# Patient Record
Sex: MALE | Race: WHITE
[De-identification: names, ages, dates, MRNs, and addresses within clinical notes are randomized per-mention and may not be internally consistent; named-entity substitution may affect disease eponyms.]

---

## 2019-11-19 ENCOUNTER — HOSPITAL ENCOUNTER (EMERGENCY)
Dept: HOSPITAL 25 - UCEAST | Age: 69
Discharge: HOME | End: 2019-11-19
Payer: MEDICARE

## 2019-11-19 VITALS — SYSTOLIC BLOOD PRESSURE: 134 MMHG | DIASTOLIC BLOOD PRESSURE: 80 MMHG

## 2019-11-19 DIAGNOSIS — S60.862A: Primary | ICD-10-CM
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
## 2019-11-19 DIAGNOSIS — Z91.018: ICD-10-CM

## 2019-11-19 DIAGNOSIS — M25.532: ICD-10-CM
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
## 2019-11-19 PROCEDURE — 99211 OFF/OP EST MAY X REQ PHY/QHP: CPT

## 2019-11-19 PROCEDURE — G0463 HOSPITAL OUTPT CLINIC VISIT: HCPCS

## 2019-11-19 NOTE — UC
Hand/Wrist HPI





- HPI Summary


HPI Summary: 





70 yo male presents with tick bite and left wrist pain. He tells me that 1 week 

ago he had a tick bite to his left forearm that he removed within 24 hours - 

was very small. Since that time he has been noticing some pain in his dorsal 

left wrist that he described as an ache. He is very active with his hands doing 

manual labor activities and yard work - denies specific injury. Resting his 

wrist makes it feel better. He is concerned he has lyme disease effecting his 

wrist. Denies numbness, tingling, radiation of pain, injury, fevers, chills, 

body aches. 





- History Of Current Complaint


Stated Complaint: WRIST PAIN AND POSS TICK


Time Seen by Provider: 11/19/19 17:52


Hx Obtained From: Patient


Onset/Duration: Sudden Onset


Severity Initially: Mild


Severity Currently: Mild


Pain Intensity: 3


Pain Scale Used: 0-10 Numeric





- Allergies/Home Medications


Allergies/Adverse Reactions: 


 Allergies











Allergy/AdvReac Type Severity Reaction Status Date / Time


 


watermelon Allergy  throat Verified 11/19/19 18:03





   swelling  











Home Medications: 


 Home Medications





Aspirin/Caffeine [Saranya Back & Body Pain Ex] 1 tab PO PRN 11/19/19 [History]











PMH/Surg Hx/FS Hx/Imm Hx





- Additional Past Medical History


Additional PMH: 





Erectile dysfunction


Endocrine History: Dyslipidemia





- Surgical History


Surgical History: Yes


Other Surgical History: Tonsillectomy





- Family History


Known Family History: Positive: Unknown





- Social History


Occupation: Retired


Lives: With Family


Alcohol Use: Occasionally


Substance Use Type: None


Smoking Status (MU): Never Smoked Tobacco





Review of Systems


All Other Systems Reviewed And Are Negative: No


Constitutional: Positive: Negative


Skin: Positive: Other - Tick bite


Respiratory: Positive: Negative


Cardiovascular: Positive: Negative


Neurovascular: Positive: Negative


Musculoskeletal: Positive: Other: - Left wrist pain


Neurological: Positive: Negative


Psychological: Positive: Negative





Physical Exam





- Summary


Physical Exam Summary: 





GENERAL: NAD. WDWN. No pain distress.


SKIN: LEFT FOREARM: there is a 5mm diameter of mild erythema with central 1mm 

area of superficial skin loss - healing. No streaking, bleeding, or drainage.


CHEST:  No accessory muscle use. Breathing comfortably and in no distress.


CV:  Pulses intact radial and ulnar. Cap refill <2seconds


MSK: LEFT WRIST: NTTP. FROM. Strength 5/5 including  strength. No edema or 

obvious bony deformities. No snuffbox tenderness. 


NEURO: Alert. Sensations intact hand and all fingers.


PSYCH: Age appropriate behavior.


Triage Information Reviewed: Yes


Vital Signs: 





Vital Signs:











Temp Pulse Resp BP Pulse Ox


 


 98.4 F   58   16   134/80   99 


 


 11/19/19 17:58  11/19/19 17:58  11/19/19 17:58  11/19/19 17:58  11/19/19 17:58











Vital Signs Reviewed: Yes





Diagnostics





- Radiology


  ** Wrist XR


Radiology Interpretation Completed By: ED Physician


Summary of Radiographic Findings: NAD





Hand/Wrist Course/Dx





- Course


Course Of Treatment: 





Left forearm area consistent with tick bite. Left wrist discomfort very 

unlikely from tick bite as 1) pt removed within 24hours and 2) joint pain from 

lyme disease likely takes weeks to months.


Discussed this with pt and recommended lyme disease testing with his PCP in 4-6 

weeks.


XR of wrist today wet read negative - suspect tendinitis given his active 

lifestyle. Recommended RICE therapy and f/u with his PCP if symptoms do not 

improve





- Differential Dx/Diagnosis


Provider Diagnosis: 


 Tick bite, Left wrist pain








Discharge ED





- Sign-Out/Discharge


Documenting (check all that apply): Patient Departure


All imaging exams completed and their final reports reviewed: No





- Discharge Plan


Condition: Stable


Disposition: HOME


Patient Education Materials:  Tendinitis (ED)


Referrals: 


Pradeep Ferreira MD [Primary Care Provider] - 


Additional Instructions: 


If you develop a fever, shortness of breath, chest pain, new or worsening 

symptoms - please call your PCP or go to the ED immediately.


 


The x-ray of your wrist appear normal this evening.





I recommend that you rest your wrist and apply ice to the area. Your wrist pain 

is not due to the recent bite on your arm, but I recommend you schedule an 

appointment with your primary doctor in 1 month for lyme disease testing given 

your (potential) recent tick bite





- Billing Disposition and Condition


Condition: STABLE


Disposition: Home

## 2019-11-19 NOTE — XMS REPORT
Continuity of Care Document (CCD)

 Created on:2019



Patient:Killian Sethi

Sex:Male

:1950

External Reference #:[id number]





Demographics







 Address  [address] [place] [address]

 

 Home Phone  [phone]

 

 Mobile Phone  [phone]

 

 Work Phone  [phone]

 

 Preferred Language  en

 

 Marital Status  Not  or 

 

 Worship Affiliation  Unknown

 

 Race  White

 

 Ethnic Group  Not  or 









Author







 Name  Jennifer Mcfadden O.D.

 

 Address  [address] [place] [address]









Care Team Providers







 Name  Role  Phone

 

 Pradeep Ferreira M.D. - Family Medicine  Care Team Information   [phone]
[phone]









Problems







 Active Problems  Provider  Date

 

 Superficial punctate keratitis  Jennifer Mcfadden O.D.  Onset: 2018

 

 Epiretinal membrane  Jennifer Mcfadden O.D.  Onset: 2018

 

 Central corneal ulcer  Jennifer Mcfadden O.D.  Onset: 2018

 

 Nuclear senile cataract  Jennifer Mcfadden O.D.  Onset: 10/30/2019







Social History







 Type  Date  Description  Comments

 

 Birth Sex    Unknown  

 

 ETOH Use    Consumes 3-4 beers per day  

 

 Tobacco Use  Start: Unknown  Patient has never smoked  

 

 Recreational Drug Use    Denies Drug Use  

 

 Smoking Status  Reviewed: 10/30/19  Patient has never smoked  







Allergies, Adverse Reactions, Alerts







 Active Allergies  Reaction  Severity  Comments  Date

 

 Watermelon  Throat Swelling  Severe    10/30/2019







Medications







 Active Medications  SIG  Qnty  Indications  Ordering Provider  Date

 

 Atorvastatin Calcium        Unknown  



      40mg Tablets          







Immunizations







 Description

 

 No Information Available







Vital Signs







 Description

 

 No Information Available







Results







 Description

 

 No Information Available







Procedures







 Date  Code  Description  Status

 

 10/15/2019  78547  Patient No Show For Appt  Completed







Medical Devices







 Description

 

 No Information Available







Encounters







 Description

 

 No Information Available







Assessments







 Date  Code  Description  Provider

 

 10/30/2019  H35.373  Puckering of macula, bilateral  Jennifer Mcfadden O.D.

 

 10/30/2019  H25.13  Age-related nuclear cataract, bilateral  Jennifer Mcfadden O.D.







Plan of Treatment

10/30/2019 - Jennifer Mcfadden O.D.H35.373 Puckering of macula, bilateralComments:
Smoking can increase the risk of developing or worsening any eye related disease
, as well as affect your overall health.  If you are a smoker, we strongly 
recommend that you quit.If you are not a smoker, we strongly recommend that you 
do not start.   A Macular Pucker is a wrinkling of the retina tissue. It can 
cause distortion in your vision. Please call the office if you notice a 
decrease or new distortion in your vision.Follow up:1 YEAR OCT MACH25.13 Age-
related nuclear cataract, bilateralComments:You have nuclear sclerosis, which 
is hardening of your natural lens. This is normal as a person ages.



Functional Status







 Description

 

 No Information Available







Mental Status







 Description

 

 No Information Available







Referrals







 Description

 

 No Information Available